# Patient Record
Sex: MALE | Race: WHITE
[De-identification: names, ages, dates, MRNs, and addresses within clinical notes are randomized per-mention and may not be internally consistent; named-entity substitution may affect disease eponyms.]

---

## 2017-07-04 ENCOUNTER — HOSPITAL ENCOUNTER (INPATIENT)
Dept: HOSPITAL 35 - ER | Age: 27
LOS: 1 days | Discharge: HOME | DRG: 552 | End: 2017-07-05
Attending: INTERNAL MEDICINE | Admitting: INTERNAL MEDICINE
Payer: COMMERCIAL

## 2017-07-04 VITALS — DIASTOLIC BLOOD PRESSURE: 88 MMHG | SYSTOLIC BLOOD PRESSURE: 161 MMHG

## 2017-07-04 VITALS — SYSTOLIC BLOOD PRESSURE: 190 MMHG | DIASTOLIC BLOOD PRESSURE: 122 MMHG

## 2017-07-04 VITALS — DIASTOLIC BLOOD PRESSURE: 82 MMHG | SYSTOLIC BLOOD PRESSURE: 146 MMHG

## 2017-07-04 VITALS — HEIGHT: 72.99 IN | BODY MASS INDEX: 25.47 KG/M2 | WEIGHT: 192.2 LBS

## 2017-07-04 DIAGNOSIS — F17.210: ICD-10-CM

## 2017-07-04 DIAGNOSIS — Z79.899: ICD-10-CM

## 2017-07-04 DIAGNOSIS — M51.27: Primary | ICD-10-CM

## 2017-07-04 DIAGNOSIS — R00.0: ICD-10-CM

## 2017-07-04 LAB
ALBUMIN SERPL-MCNC: 4.4 G/DL (ref 3.4–5)
ALP SERPL-CCNC: 80 U/L (ref 46–116)
ALT SERPL-CCNC: 27 U/L (ref 30–65)
ANION GAP SERPL CALC-SCNC: 8 MMOL/L (ref 7–16)
AST SERPL-CCNC: 24 U/L (ref 15–37)
BENZODIAZ UR-MCNC: POSITIVE UG/L
BILIRUB SERPL-MCNC: 0.9 MG/DL
BUN SERPL-MCNC: 15 MG/DL (ref 7–18)
CALCIUM SERPL-MCNC: 9.5 MG/DL (ref 8.5–10.1)
CHLORIDE SERPL-SCNC: 101 MMOL/L (ref 98–107)
CO2 SERPL-SCNC: 29 MMOL/L (ref 21–32)
CREAT SERPL-MCNC: 1.4 MG/DL (ref 0.7–1.3)
ERYTHROCYTE [DISTWIDTH] IN BLOOD BY AUTOMATED COUNT: 12.7 % (ref 10.5–14.5)
GLUCOSE SERPL-MCNC: 95 MG/DL (ref 74–106)
GRANULOCYTES NFR BLD MANUAL: 90 % (ref 36–66)
HCT VFR BLD CALC: 42.4 % (ref 42–52)
HGB BLD-MCNC: 15.1 GM/DL (ref 14–18)
LYMPHOCYTES NFR BLD AUTO: 6 % (ref 24–44)
MANUAL DIFFERENTIAL PERFORMED BLD QL: YES
MCH RBC QN AUTO: 31.9 PG (ref 26–34)
MCHC RBC AUTO-ENTMCNC: 35.5 G/DL (ref 28–37)
MCV RBC: 89.7 FL (ref 80–100)
MONOCYTES NFR BLD: 3 % (ref 1–8)
NEUTROPHILS # BLD: 11.7 THOU/UL (ref 1.4–8.2)
NEUTS BAND NFR BLD: 1 % (ref 0–8)
OPIATES UR-MCNC: POSITIVE NG/ML
PLATELET # BLD: 265 THOU/UL (ref 150–400)
POTASSIUM SERPL-SCNC: 4.1 MMOL/L (ref 3.5–5.1)
PROT SERPL-MCNC: 8.5 G/DL (ref 6.4–8.2)
RBC # BLD AUTO: 4.72 MIL/UL (ref 4.5–6)
SODIUM SERPL-SCNC: 138 MMOL/L (ref 136–145)
TOTAL CELL COUNT: 100
WBC # BLD AUTO: 12.9 THOU/UL (ref 4–11)

## 2017-07-04 PROCEDURE — 10102: CPT

## 2017-07-05 VITALS — SYSTOLIC BLOOD PRESSURE: 131 MMHG | DIASTOLIC BLOOD PRESSURE: 70 MMHG

## 2017-07-05 VITALS — SYSTOLIC BLOOD PRESSURE: 134 MMHG | DIASTOLIC BLOOD PRESSURE: 63 MMHG

## 2017-07-05 VITALS — SYSTOLIC BLOOD PRESSURE: 131 MMHG | DIASTOLIC BLOOD PRESSURE: 74 MMHG

## 2017-07-12 ENCOUNTER — HOSPITAL ENCOUNTER (EMERGENCY)
Dept: HOSPITAL 35 - ER | Age: 27
Discharge: HOME | End: 2017-07-12
Payer: COMMERCIAL

## 2017-07-12 VITALS — BODY MASS INDEX: 27.83 KG/M2 | WEIGHT: 210.01 LBS | HEIGHT: 73 IN

## 2017-07-12 DIAGNOSIS — M54.41: Primary | ICD-10-CM

## 2017-07-12 DIAGNOSIS — F10.99: ICD-10-CM

## 2017-07-12 DIAGNOSIS — F17.210: ICD-10-CM

## 2017-07-12 LAB
ANION GAP SERPL CALC-SCNC: 12 MMOL/L (ref 7–16)
BASOPHILS NFR BLD AUTO: 1.1 % (ref 0–2)
BILIRUB UR-MCNC: NEGATIVE MG/DL
BUN SERPL-MCNC: 6 MG/DL (ref 7–18)
CALCIUM SERPL-MCNC: 9.1 MG/DL (ref 8.5–10.1)
CHLORIDE SERPL-SCNC: 109 MMOL/L (ref 98–107)
CO2 SERPL-SCNC: 21 MMOL/L (ref 21–32)
COLOR UR: YELLOW
CREAT SERPL-MCNC: 1 MG/DL (ref 0.7–1.3)
EOSINOPHIL NFR BLD: 2.6 % (ref 0–3)
ERYTHROCYTE [DISTWIDTH] IN BLOOD BY AUTOMATED COUNT: 12.8 % (ref 10.5–14.5)
GLUCOSE SERPL-MCNC: 114 MG/DL (ref 74–106)
GRANULOCYTES NFR BLD MANUAL: 52.8 % (ref 36–66)
HCT VFR BLD CALC: 44.5 % (ref 42–52)
HGB BLD-MCNC: 15.8 GM/DL (ref 14–18)
KETONES UR STRIP-MCNC: NEGATIVE MG/DL
LYMPHOCYTES NFR BLD AUTO: 33.1 % (ref 24–44)
MANUAL DIFFERENTIAL PERFORMED BLD QL: NO
MCH RBC QN AUTO: 31.4 PG (ref 26–34)
MCHC RBC AUTO-ENTMCNC: 35.5 G/DL (ref 28–37)
MCV RBC: 88.7 FL (ref 80–100)
MONOCYTES NFR BLD: 10.4 % (ref 1–8)
NEUTROPHILS # BLD: 5.3 THOU/UL (ref 1.4–8.2)
NITRITE UR QL STRIP: NEGATIVE
PLATELET # BLD: 325 THOU/UL (ref 150–400)
POTASSIUM SERPL-SCNC: 3.9 MMOL/L (ref 3.5–5.1)
RBC # BLD AUTO: 5.02 MIL/UL (ref 4.5–6)
RBC # UR STRIP: NEGATIVE /UL
SODIUM SERPL-SCNC: 142 MMOL/L (ref 136–145)
SP GR UR STRIP: <= 1.005 (ref 1–1.03)
URINE GLUCOSE-RANDOM*: NEGATIVE
URINE PROTEIN (DIPSTICK): NEGATIVE
UROBILINOGEN UR STRIP-ACNC: 0.2 E.U./DL (ref 0.2–1)
WBC # BLD AUTO: 10 THOU/UL (ref 4–11)

## 2021-05-11 ENCOUNTER — HOSPITAL ENCOUNTER (INPATIENT)
Dept: HOSPITAL 35 - ER | Age: 31
LOS: 1 days | Discharge: HOME | DRG: 552 | End: 2021-05-12
Attending: HOSPITALIST | Admitting: HOSPITALIST
Payer: COMMERCIAL

## 2021-05-11 VITALS — DIASTOLIC BLOOD PRESSURE: 110 MMHG | SYSTOLIC BLOOD PRESSURE: 176 MMHG

## 2021-05-11 VITALS — BODY MASS INDEX: 27.17 KG/M2 | WEIGHT: 205 LBS | HEIGHT: 72.99 IN

## 2021-05-11 DIAGNOSIS — G89.29: ICD-10-CM

## 2021-05-11 DIAGNOSIS — M51.27: Primary | ICD-10-CM

## 2021-05-11 DIAGNOSIS — F41.9: ICD-10-CM

## 2021-05-11 DIAGNOSIS — K59.00: ICD-10-CM

## 2021-05-11 DIAGNOSIS — Z79.899: ICD-10-CM

## 2021-05-11 DIAGNOSIS — I48.0: ICD-10-CM

## 2021-05-11 DIAGNOSIS — I10: ICD-10-CM

## 2021-05-11 LAB
ALBUMIN SERPL-MCNC: 4.1 G/DL (ref 3.4–5)
ALT SERPL-CCNC: 31 U/L (ref 16–63)
ANION GAP SERPL CALC-SCNC: 9 MMOL/L (ref 7–16)
AST SERPL-CCNC: 16 U/L (ref 15–37)
BASOPHILS NFR BLD AUTO: 0.7 % (ref 0–2)
BILIRUB SERPL-MCNC: 0.6 MG/DL (ref 0.2–1)
BUN SERPL-MCNC: 11 MG/DL (ref 7–18)
CALCIUM SERPL-MCNC: 9.4 MG/DL (ref 8.5–10.1)
CHLORIDE SERPL-SCNC: 106 MMOL/L (ref 98–107)
CO2 SERPL-SCNC: 26 MMOL/L (ref 21–32)
CREAT SERPL-MCNC: 1.1 MG/DL (ref 0.7–1.3)
EOSINOPHIL NFR BLD: 1.1 % (ref 0–3)
ERYTHROCYTE [DISTWIDTH] IN BLOOD BY AUTOMATED COUNT: 12.7 % (ref 10.5–14.5)
GLUCOSE SERPL-MCNC: 102 MG/DL (ref 74–106)
GRANULOCYTES NFR BLD MANUAL: 61.9 % (ref 36–66)
HCT VFR BLD CALC: 45.2 % (ref 42–52)
HGB BLD-MCNC: 15.6 GM/DL (ref 14–18)
LYMPHOCYTES NFR BLD AUTO: 23.3 % (ref 24–44)
MCH RBC QN AUTO: 32.2 PG (ref 26–34)
MCHC RBC AUTO-ENTMCNC: 34.6 G/DL (ref 28–37)
MCV RBC: 93.2 FL (ref 80–100)
MONOCYTES NFR BLD: 13 % (ref 1–8)
NEUTROPHILS # BLD: 5.3 THOU/UL (ref 1.4–8.2)
PLATELET # BLD: 308 THOU/UL (ref 150–400)
POTASSIUM SERPL-SCNC: 3.7 MMOL/L (ref 3.5–5.1)
PROT SERPL-MCNC: 8.2 G/DL (ref 6.4–8.2)
RBC # BLD AUTO: 4.85 MIL/UL (ref 4.5–6)
SODIUM SERPL-SCNC: 141 MMOL/L (ref 136–145)
WBC # BLD AUTO: 8.6 THOU/UL (ref 4–11)

## 2021-05-11 NOTE — EMS
[address]   [address]                     EMS Patient Care Report       
_______________________________________________________________________________
 
Name:       DANIEL HARRELL         Room #:                     REG MARLEY CAMPBELL#:      [id number]                       Account #:      [id number]  
Admission:  21    Attend Phys:                          
Discharge:              Date of Birth:  [birth year]  
                                                          Report #: [id number]
                                                                    [id number]
_______________________________________________________________________________
THIS REPORT FOR:   //name//                          
 
Report Transmitted: 2021 20:57
EMS Care Summary
[facility] MED-ACT
Incident [id number] @ 2021 18:59
 
Incident Location
[address] [place] [address]
 
Patient
DANIEL HARRELL
Male, 31 Years
 1990
 
Patient Address
[address] [place] [address]
 
Patient History
Back Pain (Chronic),
 
Patient Allergies
No known allergies,
 
Patient Medications
None Reported,
 
Chief Complaint
back pain
 
Disposition
Transported No Lights/[place]
 
Dispatch Reason
Traumatic Injury
 
Transported To
[facility]
 
Narrative
Patient was found lying supine on ground near the back of his truck.  Patient 
was awake, alert and oriented upon arrival in no acute distress.  Patient 
explained to EMS that he was lifting his tool box into his truck when he had a 
sudden onset of lower back pain that was "shooting" pains into his left leg.  
 
 
 
[facility]
 [place]   [address]                     EMS Patient Care Report       
_______________________________________________________________________________
 
Name:       DANIEL HARRELL         Room #:                     REG MARLEY CAMPBELL#:      [id number]                       Account #:      [id number]  
Admission:  21    Attend Phys:                          
Discharge:              Date of Birth:  [birth year]  
                                                          Report #: [id number]
                                                                    [id number]
_______________________________________________________________________________
Patient advises EMS that he has dealt with a "bulging disk" in the lumbar 
section of his back for several years, but never had the pain like this that 
began shooting down his leg.  Patient denies any recent injury or fall, but 
does report he has noted occasional pain.  Patient denies any dizziness, 
headache, or blurred vision.  Patient also denies any loss of bowel during 
incident as well.  Patient denies any complaint of nausea, chest pain, or 
difficulty breathing.  Patient denies any recent cough, cold, fever, or sore 
throat.  Transport is offered and accepted.  destination of choice is [place].  
Patient was able to stand upright with assistance and sit onto cot.  Patient 
was secured to cot and moved to ambulance for further exam.  IV was established 
and patient was administered fentanyl for pain.  Patient was able to relax and 
transport was initiated to [place]. 
Patient did report pain decrease after pain medicine during transport.  Patient 
was able to relax some, but still reported the pain occasionally "shooting down 
his leg." Patient denies any other complaints during transport. 
Patient was taken to ED room 2 at [place].  Patient was able to move onto ED 
bed once beds were placed at same height.  Verbal report and patient care was 
given to ED staff at bedside. 
 
Initial Vitals
@19:18P: 99,BP: 174/110,Pain: 5/10,
@19:26P: 93,R: 20,BP: 153/103,Temp: 97.8F,SpO2: 98,MI Suspected: false
@19:10P: 111,R: 18,BP: 188/129,Pain: 8/10,GCS: 15,SpO2: 100,Revised Trauma: 12,
 
Assessments
@19:09MENTAL:Person Oriented,Time Oriented,Place Oriented,Event 
Oriented,SKIN:HEENT:LUNG SOUNDS:General: No Abnormalities,ABDOMEN:General: No 
Abnormalities,PELVIS//GI:EXTREMITIES:Left Arm: No Abnormalities,Right Arm: No 
Abnormalities,Left Leg: No Abnormalities,Right Leg: No 
Abnormalities,PULSE:NEURO:No Abnormalities, 
 
Impression
Back Pain
 
Procedures
@19:11Surgical Mask on PatientResponse: Unchanged@19:12Saline Lock 10cc (20 ga) 
Site: Antecubital-LeftResponse: UnchangedSucceeded@19:15Fentanyl  - 100 
Micrograms (mcg) - Intravenous (IV)Response: Improved 
 
Timeline
18:58,Call Received
18:58,Psap Call
18:59,Dispatched
19:00,En Route
19:03,On Scene
19:04,At Patient
 
 
 
[place] [address]                     EMS Patient Care Report       
_______________________________________________________________________________
 
Name:       DANIEL HARRELL PETTY         Room #:                     REG MARLEY CAMPBELL#:      [id number]                       Account #:      [id number]  
Admission:  21    Attend Phys:                          
Discharge:              Date of Birth:  [birth year]  
                                                          Report #: [id number]
                                                                    [id number]
_______________________________________________________________________________
19:10,BP: 188/129 M,PULSE: 111,RR: 18 R,SPO2: 100 Ox,ETCO2:  ,BG: ,PAIN: 8,GCS: 
15, 
19:11,Surgical Mask on Patient,Response: Unchanged
19:12,Saline Lock 10cc 20 ga Site: Antecubital-Left,Response: 
UnchangedSucceeded, 
19:15,Fentanyl  - 100 Micrograms (mcg) - Intravenous (IV),Response: Improved
19:17,Depart Scene
19:18,BP: 174/110 M,PULSE: 99,RR:  R,SPO2:  Ox,ETCO2:  ,BG: ,PAIN: 5,GCS: ,
19:26,BP: 153/103 M,PULSE: 93,RR: 20 R,SPO2: 98 Ox,ETCO2:  ,BG: ,PAIN: ,GCS: ,
19:29,At Destination
19:50,Call Closed
 
Disclaimer
v1.1     Copyright  [employer]
This EMS Care Summary contains data elements from the applicable legal record 
(which may be displayed differently). It is designed to provide pertinent 
information for the following purposes: continuity of care, clinical quality, 
and state data reporting. The complete legal record is available to ED staff 
and administrators of the receiving hospital in [employer] Patient Tracker. All data 
is provided "as is."

## 2021-05-12 VITALS — SYSTOLIC BLOOD PRESSURE: 139 MMHG | DIASTOLIC BLOOD PRESSURE: 88 MMHG

## 2021-05-12 VITALS — SYSTOLIC BLOOD PRESSURE: 154 MMHG | DIASTOLIC BLOOD PRESSURE: 82 MMHG

## 2021-05-12 VITALS — SYSTOLIC BLOOD PRESSURE: 143 MMHG | DIASTOLIC BLOOD PRESSURE: 92 MMHG

## 2021-05-12 VITALS — DIASTOLIC BLOOD PRESSURE: 73 MMHG | SYSTOLIC BLOOD PRESSURE: 136 MMHG

## 2021-05-12 VITALS — SYSTOLIC BLOOD PRESSURE: 136 MMHG | DIASTOLIC BLOOD PRESSURE: 73 MMHG

## 2021-05-12 VITALS — DIASTOLIC BLOOD PRESSURE: 91 MMHG | SYSTOLIC BLOOD PRESSURE: 147 MMHG

## 2021-05-12 NOTE — NUR
RECEIVED CARE OF PATIENT AT 0100 VIA CART FROM ED ACCOMPANIED BY ED PERSONEL.
PATIENT ALERT AND ORIENTED X4.  C/O PAIN.  WAS WAITING FOR PAIN MED ORDERS
BEFORE MED COULD BE GIVEN.  VS WERE TAKEN AT 0137 BY PCA.  AT 0140 PATIENT WAS
FOUND ON FLOOR.  IT WAS UNWITNESSED AND DISCOVERED BY PCA.  PATIENT WAS ALERT
AND ORIENTED STILL.  STATED HE BELIEVED HE COULD GET UP AND GO TO THE
BATHROOM.  HE DID NOT MENTION TO PAC HE NEEDED TO GO TO BATHROOM.  NO APPARENT
INJURY NOTED.  PATIENT DENIES HITTING HEAD.  NP SHAVON, AND THE SUPERVISOR WAS
NOTIFIED.  PATIENT DID NOT WANT ANY FAMILY MEMBER NOTIFIED.  PATIENT EDUCATED
IN THE FACT HE NEEDS TO CALL FOR HELP TO GET UP.  PATIENT STATES HE
UNDERSTANDS THE IMPORTANCE OF CALLING FOR HELP.

## 2021-05-12 NOTE — NUR
Received awake on bed. Due medications given as prescribed, able to swallow
meds w/o difficulty. On room air. Vital signs stable. On MS, not on
telemetry; no complains and signs of chest pain, crushing sensation and
heaviness. Assisted in ADLs. On heart healthy diet- tolerating well; no
nausea, no vomiting and no abdominal pain noted. Continent of bowel and
bladder, using urinal and able to go to the toilet with standby assist- output
measured and recorded accordingly. Falls bundle in place; fall this AM.
With SL at L AC- intact; on IV pain meds.
Complained of back pain, due PRN pain meds given as prescribed; pt requesting
to have pain meds review for additional PO meds and asking if he can be
discharged today- Dr Morrow informed.
Patient seen and examined by OT/PT- able to walk in the hallway with cane- Dr Morrow informed.
To continue monitoring patient.

## 2021-05-12 NOTE — NUR
ASSESSMENT: CM REVIEWED CHART AND MET WITH PATIENT. PT IS ALERT AND ORIENTED
X4. 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
. PT REPORTS THAT HE LIVES IN A HOUSE WITH HIS MOTHER. PT
IS NORMALLY FULLY INDEPENDENT WITH ADLS AND AMBULATION. PT REPORTS HAVING
ACCESS TO A CANE. PT IS ADMITTED DUE TO
PAIN CONTROL. PT REPORTS HE HAS NO ACTIVE INSURANCE AND REPORTS HIS INSURANCE
WILL BECOME ACTIVE ON JUNE 1ST. PT REPORTS HE CURRENTLY HAS NO PCP. CM
PROVIDED PATIENT WITH SAFETY NET CLINICS AND CONTACTS IF NEEDED. CM ALSO
PROVIDED PATIENT WITH LIST OF PCP HERE AT [facility] FOR WHEN HIS INSURANCE IS
ACTIVE TO SEE IF THEY ARE IN NETWORK. CM ALSO PROVIDED PATIENT CONTACT NUMBER
TO THE PAIN CLINIC AS HE REQUESTED. PT WAS SEEN BY PHYSICAL THERAPY AND
CLEARED TO DISCHARGE AND SAFE FOR HOME WITH ASSISTANCE OF CANE. PT REPORTS NO
FURTHER NEEDS FROM CM.

## 2021-05-12 NOTE — NUR
PT REQUEST ANY ASSISTance with FILLING HIS MEDICATION AS HE IS PATIENT PAY.
ATTENDING STATING PATIENT WANTS HIS MEDICATIONS FILLED AT [facility]
HERE. CM FAXED SCRIPTS DOWN TO PHARMACY. CM WILL COVER THE COST OF PREDNISONE
(10.93) AND PT WILL HAVE TO PAY FOR HIS PAIN MEDICATIONS. CM FAXED FACESHEET
TO  OUTPATIENT PHARMACY. CM DIRECTOR APPROVAL FOR COVERING PREDNISONE.
[facility] IS FILLING ALL MEDICATIONS AND PT CAN PICK THEM UP PRIOR
TO LEAVING AT TIME OF DISCHARGE. CM NOTIFIED PT AS WELL AS BEDSIDE RN AND PT
WILL NEED TO TAKE ORIGINAL SCRIPTS TO PHARMACY BEFORE CLOSE AT 1530.

## 2021-05-17 ENCOUNTER — HOSPITAL ENCOUNTER (EMERGENCY)
Dept: HOSPITAL 35 - ER | Age: 31
Discharge: HOME | End: 2021-05-17
Payer: COMMERCIAL

## 2021-05-17 VITALS — SYSTOLIC BLOOD PRESSURE: 158 MMHG | DIASTOLIC BLOOD PRESSURE: 99 MMHG

## 2021-05-17 VITALS — HEIGHT: 73 IN | BODY MASS INDEX: 27.17 KG/M2 | WEIGHT: 205.01 LBS

## 2021-05-17 DIAGNOSIS — G89.29: ICD-10-CM

## 2021-05-17 DIAGNOSIS — M51.36: Primary | ICD-10-CM

## 2021-05-17 DIAGNOSIS — Z79.899: ICD-10-CM

## 2021-05-17 DIAGNOSIS — I10: ICD-10-CM

## 2021-05-17 DIAGNOSIS — I48.91: ICD-10-CM

## 2021-05-17 DIAGNOSIS — F17.210: ICD-10-CM
